# Patient Record
(demographics unavailable — no encounter records)

---

## 2024-12-03 NOTE — REASON FOR VISIT
[Post-Operative Visit] : a post-operative visit for [Father] : father [Medical Records] : medical records [FreeTextEntry2] : OME

## 2024-12-03 NOTE — ASSESSMENT
[FreeTextEntry1] : SHERRILL is a 3 year old boy now s/p bilateral myringotomy with tubes 3/27/24  Eustachian Tube Dysfunction - audiogram 5/28/24 borderline essentially normal SDT 15 AU large volume  - expectant management, monitor PET q6months until extrusion

## 2024-12-03 NOTE — CONSULT LETTER
[Dear  ___] : Dear  [unfilled], [Courtesy Letter:] : I had the pleasure of seeing your patient, [unfilled], in my office today. [Please see my note below.] : Please see my note below. [Consult Closing:] : Thank you very much for allowing me to participate in the care of this patient.  If you have any questions, please do not hesitate to contact me. [Sincerely,] : Sincerely, [FreeTextEntry2] : [place] [address]  2.0 mi [phone] [FreeTextEntry3] : Traci Brown MD Pediatric Otolaryngology / [place] [address] Tel [phone] Fax [phone]

## 2024-12-03 NOTE — HISTORY OF PRESENT ILLNESS
[de-identified] : 3 year old boy presents for 6 month follow-up s/p Bilateral myringotomy with tubes 3/27/2024 History was obtained from patient, parents and chart.   Parents reports they believe tubes are still in place.  Denies current otalgia or otorrhea. No ear infections since last visit.  No parental concerns with hearing or speech.  No current nasal congestion or snoring.  Eating/drinking without any issues.

## 2025-04-24 NOTE — HISTORY OF PRESENT ILLNESS
[de-identified] : Today I had the pleasure of seeing SHERRILL KENDALL for follow up evaluation of ear tubes History was obtained from patient, parents and chart.  [de-identified] : 2 weeks ago on the plane had pain in his ear, no drainage, right ear pain

## 2025-04-24 NOTE — ASSESSMENT
[FreeTextEntry1] : SHERRILL is a 3 year old boy now s/p bilateral myringotomy with tubes 3/27/24  Eustachian Tube Dysfunction - well appearing today - audiogram 5/28/24 borderline essentially normal SDT 15 AU large volume  - expectant management, monitor PET q6months until extrusion

## 2025-04-24 NOTE — REASON FOR VISIT
[Subsequent Evaluation] : a subsequent evaluation for [FreeTextEntry2] :  s/p Bilateral myringotomy with tubes 3/27/2024 [Parents] : parents